# Patient Record
Sex: MALE | Race: WHITE | NOT HISPANIC OR LATINO | URBAN - NONMETROPOLITAN AREA
[De-identification: names, ages, dates, MRNs, and addresses within clinical notes are randomized per-mention and may not be internally consistent; named-entity substitution may affect disease eponyms.]

---

## 2019-03-15 ENCOUNTER — IMPORTED ENCOUNTER (OUTPATIENT)
Dept: URBAN - NONMETROPOLITAN AREA CLINIC 1 | Facility: CLINIC | Age: 69
End: 2019-03-15

## 2019-03-15 PROBLEM — E11.9: Noted: 2019-03-15

## 2019-03-15 PROBLEM — H52.4: Noted: 2019-03-15

## 2019-03-15 PROBLEM — H52.13: Noted: 2019-03-15

## 2019-03-15 PROBLEM — H25.813: Noted: 2019-03-15

## 2019-03-15 PROCEDURE — 92015 DETERMINE REFRACTIVE STATE: CPT

## 2019-03-15 PROCEDURE — 92004 COMPRE OPH EXAM NEW PT 1/>: CPT

## 2019-03-15 NOTE — PATIENT DISCUSSION
Myopia/Astig/presbyopia-  Discussed refractive status w/ pt today-  MR done new GLRx given-  Monitor yearly or PRN Type II DM dx 2012-  Discussed findings w/ pt today-  Controlled with oral medication and diet-  No diabetic retinopathy noted on exam today-  Baseline Optos done today fair quality due to dense cataracts. -  Monitor closely for changesCombined Cataracts OU-  Discussed findings w/ pt today-  Signs/symptoms associated with progression discussed-  Visually significant with decreased VA noted by patient and exam today-  Recommend evaluation by Dr. Harper Level; Dr's Notes: MR  3/15/19DFE  3/15/19Optos  3/15/19 - baseline

## 2019-06-19 ENCOUNTER — IMPORTED ENCOUNTER (OUTPATIENT)
Dept: URBAN - NONMETROPOLITAN AREA CLINIC 1 | Facility: CLINIC | Age: 69
End: 2019-06-19

## 2019-06-19 PROBLEM — E11.9: Noted: 2019-06-19

## 2019-06-19 PROBLEM — H52.4: Noted: 2019-06-19

## 2019-06-19 PROBLEM — H25.813: Noted: 2019-06-19

## 2019-06-19 PROBLEM — H52.13: Noted: 2019-06-19

## 2019-06-19 PROCEDURE — 92014 COMPRE OPH EXAM EST PT 1/>: CPT

## 2019-06-19 NOTE — PATIENT DISCUSSION
"Cataract(s)-Visually significant cataract OU.-Cataract(s) causing symptomatic impairment of visual function not correctable with a tolerable change in glasses or contact lenses lighting or non-operative means resulting in specific activity limitations and/or participation restrictions including but not limited to reading viewing television driving or meeting vocational or recreational needs. -Expectation is clearer vision and functional improvement in symptoms as well as reduced glare disability after cataract removal.-Order IOLMaster and OPD today. -Recommend Standard/Traditional based on today's OPD testing and lifestyle questionnaire.-All questions were answered regarding surgery including pre and post-op medications appointments activity restrictions and anesthetic usage.-The risks benefits and alternatives and special risk factors for the patient were discussed in detail including but not limited to: bleeding infection retinal detachment vitreous loss problems with the implant and possible need for additional surgery.-Although rare the possibility of complete vision loss was discussed.-The possible need for glasses post-operatively was discussed.-Order medical clearance exam based on history of HBP/high cholesterol-Patient elects to proceed with cataract surgery OD . Will schedule at patient's convenience and re-evaluate OS in the future. **Informed patient he will need mild correction for distance vision OU s/p CE OU**Patient will need to take right lens out s/p CE OD. **Discussed anisometropia in detial with patient and informed him he will need to have the left cataract out too. **Informed patient he may try the OTC eye drops ""that he thinks will reduce his cataracts"" informed patient I am in no hurry to do his cataract surgery so if he thinks that is his best option then he may do that and then reschedule with me.  Discussed the risk if cataracts get harder/larger.; 's Notes: MR  3/15/19DFE  3/15/19Optos  3/15/19 - baseline"

## 2022-04-09 ASSESSMENT — VISUAL ACUITY
OS_AM: 20/20
OD_SC: 20/60-2
OD_GLARE: 20/250
OU_CC: 20/30+
OS_GLARE: 20/200
OS_GLARE: 20/200
OD_PH: 20/40-2
OD_SC: 20/70+
OD_GLARE: 20/250
OD_CC: 20/30+
OS_SC: 20/50-2
OD_PAM: 20/20
OS_PH: 20/30-2
OS_CC: 20/30
OS_SC: 20/50-2

## 2022-04-09 ASSESSMENT — TONOMETRY
OS_IOP_MMHG: 14
OD_IOP_MMHG: 12
OS_IOP_MMHG: 12
OD_IOP_MMHG: 15